# Patient Record
Sex: MALE | Race: WHITE | Employment: FULL TIME | URBAN - METROPOLITAN AREA
[De-identification: names, ages, dates, MRNs, and addresses within clinical notes are randomized per-mention and may not be internally consistent; named-entity substitution may affect disease eponyms.]

---

## 2024-04-09 ENCOUNTER — APPOINTMENT (OUTPATIENT)
Dept: GENERAL RADIOLOGY | Age: 29
End: 2024-04-09
Payer: COMMERCIAL

## 2024-04-09 ENCOUNTER — HOSPITAL ENCOUNTER (EMERGENCY)
Age: 29
Discharge: HOME | End: 2024-04-09
Attending: EMERGENCY MEDICINE
Payer: COMMERCIAL

## 2024-04-09 ENCOUNTER — APPOINTMENT (OUTPATIENT)
Dept: CT IMAGING | Age: 29
End: 2024-04-09
Payer: COMMERCIAL

## 2024-04-09 VITALS
DIASTOLIC BLOOD PRESSURE: 89 MMHG | WEIGHT: 230 LBS | TEMPERATURE: 97.6 F | SYSTOLIC BLOOD PRESSURE: 132 MMHG | HEART RATE: 62 BPM | OXYGEN SATURATION: 98 % | BODY MASS INDEX: 32.93 KG/M2 | HEIGHT: 70 IN | RESPIRATION RATE: 14 BRPM

## 2024-04-09 DIAGNOSIS — S09.90XA CLOSED HEAD INJURY, INITIAL ENCOUNTER: ICD-10-CM

## 2024-04-09 DIAGNOSIS — S89.92XA INJURY OF LEFT KNEE, INITIAL ENCOUNTER: Primary | ICD-10-CM
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
## 2024-04-09 PROCEDURE — 70450 CT HEAD/BRAIN W/O DYE: CPT

## 2024-04-09 PROCEDURE — 90714 TD VACC NO PRESV 7 YRS+ IM: CPT

## 2024-04-09 PROCEDURE — 12001 RPR S/N/AX/GEN/TRNK 2.5CM/<: CPT

## 2024-04-09 PROCEDURE — 73562 X-RAY EXAM OF KNEE 3: CPT

## 2024-04-09 PROCEDURE — 73110 X-RAY EXAM OF WRIST: CPT

## 2024-04-09 PROCEDURE — 6360000002 HC RX W HCPCS

## 2024-04-09 PROCEDURE — 90471 IMMUNIZATION ADMIN: CPT

## 2024-04-09 PROCEDURE — 2500000003 HC RX 250 WO HCPCS

## 2024-04-09 PROCEDURE — 6370000000 HC RX 637 (ALT 250 FOR IP)

## 2024-04-09 PROCEDURE — 99284 EMERGENCY DEPT VISIT MOD MDM: CPT

## 2024-04-09 RX ORDER — GINSENG 100 MG
CAPSULE ORAL ONCE
Status: COMPLETED | OUTPATIENT
Start: 2024-04-09 | End: 2024-04-09

## 2024-04-09 RX ORDER — LIDOCAINE HYDROCHLORIDE AND EPINEPHRINE 10; 10 MG/ML; UG/ML
20 INJECTION, SOLUTION INFILTRATION; PERINEURAL ONCE
Status: COMPLETED | OUTPATIENT
Start: 2024-04-09 | End: 2024-04-09

## 2024-04-09 RX ORDER — BACITRACIN ZINC AND POLYMYXIN B SULFATE 500; 1000 [USP'U]/G; [USP'U]/G
OINTMENT TOPICAL
Qty: 28.4 G | Refills: 1 | Status: SHIPPED | OUTPATIENT
Start: 2024-04-09 | End: 2024-04-16

## 2024-04-09 RX ORDER — CEPHALEXIN 500 MG/1
500 CAPSULE ORAL ONCE
Status: COMPLETED | OUTPATIENT
Start: 2024-04-09 | End: 2024-04-09

## 2024-04-09 RX ORDER — BACITRACIN ZINC 500 [USP'U]/G
OINTMENT TOPICAL ONCE
Status: DISCONTINUED | OUTPATIENT
Start: 2024-04-09 | End: 2024-04-09 | Stop reason: SDUPTHER

## 2024-04-09 RX ORDER — ACETAMINOPHEN 650 MG
TABLET, EXTENDED RELEASE ORAL ONCE
Status: COMPLETED | OUTPATIENT
Start: 2024-04-09 | End: 2024-04-09

## 2024-04-09 RX ORDER — CEPHALEXIN 500 MG/1
500 CAPSULE ORAL 4 TIMES DAILY
Qty: 28 CAPSULE | Refills: 0 | Status: SHIPPED | OUTPATIENT
Start: 2024-04-09 | End: 2024-04-16

## 2024-04-09 RX ADMIN — Medication: at 13:20

## 2024-04-09 RX ADMIN — BACITRACIN: 500 OINTMENT TOPICAL at 14:31

## 2024-04-09 RX ADMIN — CLOSTRIDIUM TETANI TOXOID ANTIGEN (FORMALDEHYDE INACTIVATED) AND CORYNEBACTERIUM DIPHTHERIAE TOXOID ANTIGEN (FORMALDEHYDE INACTIVATED) 0.5 ML: 5; 2 INJECTION, SUSPENSION INTRAMUSCULAR at 11:49

## 2024-04-09 RX ADMIN — CEPHALEXIN 500 MG: 500 CAPSULE ORAL at 13:20

## 2024-04-09 RX ADMIN — LIDOCAINE HYDROCHLORIDE,EPINEPHRINE BITARTRATE 20 ML: 10; .01 INJECTION, SOLUTION INFILTRATION; PERINEURAL at 13:20

## 2024-04-09 ASSESSMENT — PAIN - FUNCTIONAL ASSESSMENT: PAIN_FUNCTIONAL_ASSESSMENT: 0-10

## 2024-04-09 ASSESSMENT — LIFESTYLE VARIABLES
HOW MANY STANDARD DRINKS CONTAINING ALCOHOL DO YOU HAVE ON A TYPICAL DAY: PATIENT DOES NOT DRINK
HOW OFTEN DO YOU HAVE A DRINK CONTAINING ALCOHOL: NEVER

## 2024-04-09 ASSESSMENT — PAIN SCALES - GENERAL: PAINLEVEL_OUTOF10: 8
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
## 2024-04-09 NOTE — DISCHARGE INSTRUCTIONS
CT HEAD WO CONTRAST   Final Result   No acute intracranial abnormality.      Left maxillary sinusitis.         XR WRIST RIGHT (MIN 3 VIEWS)   Final Result   Normal wrist radiographs         XR KNEE LEFT (3 VIEWS)   Final Result   No acute abnormality of the knee.         Please call your primary care doctor to schedule an appointment for a follow-up visit for wound recheck and suture removal.  Estimate sutures need to stay in place for 7 to 10 days.  Please keep area clean, dry, covered.  Apply antibiotic ointment twice daily and change dressings twice daily.  If you notice any redness, warmth, abnormal drainage, increasing pain, fevers, red streaking up or down leg, he need to return to the emergency department for reevaluation.